# Patient Record
Sex: MALE | Race: BLACK OR AFRICAN AMERICAN | NOT HISPANIC OR LATINO | Employment: UNEMPLOYED | URBAN - NONMETROPOLITAN AREA
[De-identification: names, ages, dates, MRNs, and addresses within clinical notes are randomized per-mention and may not be internally consistent; named-entity substitution may affect disease eponyms.]

---

## 2023-12-19 ENCOUNTER — TELEPHONE (OUTPATIENT)
Dept: CARDIOLOGY | Facility: CLINIC | Age: 48
End: 2023-12-19
Payer: COMMERCIAL

## 2023-12-19 NOTE — TELEPHONE ENCOUNTER
Patients surgery is 12/21/1923 Nurse states he has probably already started to hold his asa and Plavix.  Wants to know if she should cancel the surgery? [phone] option 3

## 2023-12-19 NOTE — TELEPHONE ENCOUNTER
Return call from surgeon's office. Surgery will be rescheduled upon approval for hold timeframe of his medications.

## 2023-12-19 NOTE — TELEPHONE ENCOUNTER
Lu from Dr. Landrum office called requesting POC and okay to hold Plavix and ASA prior to Surgery.  Unsure of what surgery or date of the surgery. Left message for Lu to return call with this information.     Once she responds we can forward to Dr. Luz Grijalva MD to review upon return.

## 2024-01-24 PROBLEM — Z79.4 DIABETES MELLITUS WITH INSULIN THERAPY (MULTI): Status: ACTIVE | Noted: 2024-01-24

## 2024-01-24 PROBLEM — E78.2 MIXED HYPERLIPIDEMIA: Status: ACTIVE | Noted: 2024-01-24

## 2024-01-24 PROBLEM — I10 ESSENTIAL HYPERTENSION: Status: ACTIVE | Noted: 2024-01-24

## 2024-01-24 PROBLEM — I25.10 CAD (CORONARY ARTERY DISEASE): Status: ACTIVE | Noted: 2024-01-24

## 2024-01-24 PROBLEM — E11.9 DIABETES MELLITUS WITH INSULIN THERAPY (MULTI): Status: ACTIVE | Noted: 2024-01-24

## 2024-01-24 RX ORDER — HYDROCHLOROTHIAZIDE 25 MG/1
25 TABLET ORAL DAILY
COMMUNITY
Start: 2023-10-19

## 2024-01-24 RX ORDER — METOPROLOL TARTRATE 50 MG/1
50 TABLET ORAL
COMMUNITY
Start: 2023-10-11

## 2024-01-24 RX ORDER — ALBUTEROL SULFATE 90 UG/1
2 AEROSOL, METERED RESPIRATORY (INHALATION) EVERY 6 HOURS PRN
COMMUNITY
Start: 2023-11-17

## 2024-01-24 RX ORDER — ATORVASTATIN CALCIUM 80 MG/1
80 TABLET, FILM COATED ORAL DAILY
COMMUNITY
Start: 2023-10-11

## 2024-01-24 RX ORDER — DULOXETIN HYDROCHLORIDE 20 MG/1
40 CAPSULE, DELAYED RELEASE ORAL DAILY
COMMUNITY
Start: 2023-09-10

## 2024-01-24 RX ORDER — INSULIN GLARGINE 100 [IU]/ML
INJECTION, SOLUTION SUBCUTANEOUS
COMMUNITY
Start: 2023-11-16

## 2024-01-24 RX ORDER — AMLODIPINE BESYLATE 5 MG/1
5 TABLET ORAL DAILY
COMMUNITY
Start: 2023-11-01 | End: 2024-05-01

## 2024-01-24 RX ORDER — CLOPIDOGREL BISULFATE 75 MG/1
75 TABLET ORAL DAILY
COMMUNITY
Start: 2023-09-30

## 2024-01-24 RX ORDER — INSULIN ASPART 100 [IU]/ML
INJECTION, SOLUTION INTRAVENOUS; SUBCUTANEOUS
COMMUNITY
Start: 2023-10-29

## 2024-01-24 RX ORDER — EMPAGLIFLOZIN 25 MG/1
25 TABLET, FILM COATED ORAL DAILY
COMMUNITY
Start: 2023-12-11

## 2024-01-24 RX ORDER — NITROGLYCERIN 0.4 MG/1
0.4 TABLET SUBLINGUAL EVERY 5 MIN PRN
COMMUNITY
Start: 2023-12-11

## 2024-02-06 ENCOUNTER — OFFICE VISIT (OUTPATIENT)
Dept: CARDIOLOGY | Facility: CLINIC | Age: 49
End: 2024-02-06
Payer: COMMERCIAL

## 2024-02-06 VITALS
HEIGHT: 70 IN | SYSTOLIC BLOOD PRESSURE: 156 MMHG | WEIGHT: 281 LBS | HEART RATE: 84 BPM | BODY MASS INDEX: 40.23 KG/M2 | DIASTOLIC BLOOD PRESSURE: 84 MMHG

## 2024-02-06 DIAGNOSIS — I10 ESSENTIAL HYPERTENSION: ICD-10-CM

## 2024-02-06 DIAGNOSIS — I25.10 CORONARY ARTERY DISEASE INVOLVING NATIVE CORONARY ARTERY OF NATIVE HEART WITHOUT ANGINA PECTORIS: Primary | ICD-10-CM

## 2024-02-06 DIAGNOSIS — E78.2 MIXED HYPERLIPIDEMIA: ICD-10-CM

## 2024-02-06 PROCEDURE — 3008F BODY MASS INDEX DOCD: CPT | Performed by: INTERNAL MEDICINE

## 2024-02-06 PROCEDURE — 3079F DIAST BP 80-89 MM HG: CPT | Performed by: INTERNAL MEDICINE

## 2024-02-06 PROCEDURE — 3077F SYST BP >= 140 MM HG: CPT | Performed by: INTERNAL MEDICINE

## 2024-02-06 PROCEDURE — 99214 OFFICE O/P EST MOD 30 MIN: CPT | Performed by: INTERNAL MEDICINE

## 2024-02-06 RX ORDER — NEBULIZER AND COMPRESSOR
EACH MISCELLANEOUS
Qty: 1 EACH | Refills: 0 | Status: SHIPPED | OUTPATIENT
Start: 2024-02-06

## 2024-02-06 RX ORDER — SPIRONOLACTONE AND HYDROCHLOROTHIAZIDE 25; 25 MG/1; MG/1
1 TABLET ORAL DAILY
Qty: 90 TABLET | Refills: 3 | Status: SHIPPED | OUTPATIENT
Start: 2024-02-06 | End: 2025-02-05

## 2024-02-06 NOTE — PATIENT INSTRUCTIONS
Please bring all medicines, vitamins, and herbal supplements with you when you come to the office.    Prescriptions will not be filled unless you are compliant with your follow up appointments or have a follow up appointment scheduled as per instruction of your physician. Refills should be requested at the time of your visit.     Stop hydrochlorothiazide  Start Aldactazide  Chem 6  B/p check 2 months  Follow up 6 months

## 2024-02-06 NOTE — PROGRESS NOTES
"Subjective   Nataliya Alcantar is a 48 y.o. male       Chief Complaint    Follow-up          HPI   Patient is here for follow-up and management for coronary disease previous myocardial infarction and PCI in [facility], hypertension, hyperlipidemia.  Since last time I saw him he denies any cardiac complaint.  Described functional class I.  He denies lightheadedness, dizziness or syncope.  His recent laboratory data showed potassium of 3.4.  His blood pressure is suboptimally controlled.    Assessment     1. Coronary artery disease with prior presentation with myocardial infarction while incarcerated status post PCI I suspect to the right coronary artery but record is not available this was done in [facility]  2. Hypertension not well-controlled  3. Hyperlipidemia no recent lab  4. Diabetes mellitus  5. History of angioneurotic edema with Ace  6. Obesity  7.  Recent labs suggest mild hypokalemia with potassium 3.4     Plan     1. We discussed risk factor modification  2.  I advised him to switch hydrochlorothiazide to Aldactazide to improve his blood pressure and address his potassium and will have him come back for blood pressure check BMP and lipid profile in a few weeks  3. I recommended exercise and losing weight  4. 6-month follow-up  5. Obtain records from [facility]   Review of Systems   All other systems reviewed and are negative.           Visit Vitals  /84 (BP Location: Left arm, Patient Position: Sitting)   Pulse 84   Ht 1.778 m (5' 10\")   Wt 127 kg (281 lb)   BMI 40.32 kg/m²   Smoking Status Never   BSA 2.5 m²        Objective   Physical Exam  Constitutional:       Appearance: Normal appearance. He is normal weight.   HENT:      Nose: Nose normal.   Neck:      Vascular: No carotid bruit.   Cardiovascular:      Rate and Rhythm: Normal rate.      Pulses: Normal pulses.      Heart sounds: Normal heart sounds.   Pulmonary:      Effort: Pulmonary effort is normal.   Abdominal:      General: Bowel sounds are " normal.      Palpations: Abdomen is soft.   Genitourinary:     Rectum: Normal.   Musculoskeletal:         General: Normal range of motion.      Cervical back: Normal range of motion.      Right lower leg: No edema.      Left lower leg: No edema.   Skin:     General: Skin is warm and dry.   Neurological:      General: No focal deficit present.      Mental Status: He is alert.   Psychiatric:         Mood and Affect: Mood normal.         Behavior: Behavior normal.         Thought Content: Thought content normal.         Judgment: Judgment normal.         Current Medications    Current Outpatient Medications:     amLODIPine (Norvasc) 5 mg tablet, Take 1 tablet (5 mg) by mouth once daily., Disp: , Rfl:     atorvastatin (Lipitor) 80 mg tablet, Take 1 tablet (80 mg) by mouth once daily., Disp: , Rfl:     clopidogrel (Plavix) 75 mg tablet, Take 1 tablet (75 mg) by mouth once daily., Disp: , Rfl:     DULoxetine (Cymbalta) 20 mg DR capsule, Take 2 capsules (40 mg) by mouth once daily., Disp: , Rfl:     hydroCHLOROthiazide (HYDRODiuril) 25 mg tablet, Take 1 tablet (25 mg) by mouth once daily., Disp: , Rfl:     Jardiance 25 mg, Take 1 tablet (25 mg) by mouth once daily., Disp: , Rfl:     Lantus Solostar U-100 Insulin 100 unit/mL (3 mL) pen, Inject under the skin. As directed, Disp: , Rfl:     metoprolol tartrate (Lopressor) 50 mg tablet, Take 1 tablet by mouth 2 times a day with meals., Disp: , Rfl:     nitroglycerin (Nitrostat) 0.4 mg SL tablet, Place 1 tablet (0.4 mg) under the tongue every 5 minutes if needed for chest pain., Disp: , Rfl:     NovoLOG Flexpen U-100 Insulin 100 unit/mL (3 mL) pen, Inject under the skin. As directed, Disp: , Rfl:     Ventolin HFA 90 mcg/actuation inhaler, Inhale 2 puffs every 6 hours if needed., Disp: , Rfl:     miscellaneous medical supply (Blood Pressure Cuff) misc, B/p monitor as directed, Disp: 1 each, Rfl: 0    spironolacton-hydrochlorothiaz (Aldactazide) 25-25 mg tablet, Take 1 tablet (25  mg) by mouth once daily., Disp: 90 tablet, Rfl: 3                     Assessment/Plan   1. Coronary artery disease involving native coronary artery of native heart without angina pectoris  Basic Metabolic Panel    Follow Up In Cardiology    Follow Up In Cardiology      2. Essential hypertension  spironolacton-hydrochlorothiaz (Aldactazide) 25-25 mg tablet    Basic Metabolic Panel    Follow Up In Cardiology    Follow Up In Cardiology    miscellaneous medical supply (Blood Pressure Cuff) misc      3. Mixed hyperlipidemia        4. BMI 40.0-44.9, adult (CMS/HCC)           Scribe Attestation  By signing my name below, I, [email or web address] , Scribe   attest that this documentation has been prepared under the direction and in the presence of Luz Grijalva MD.

## 2024-02-06 NOTE — LETTER
February 6, 2024     CASEY Ridley  [address] [place] [id number]    Patient: Nataliya Alcantar   YOB: 1975   Date of Visit: 2/6/2024       Dear SAMANTHA Schwab-CNP:    Thank you for referring Nataliya Alcantar to me for evaluation. Below are my notes for this consultation.  If you have questions, please do not hesitate to call me. I look forward to following your patient along with you.       Sincerely,     Luz Grijalva MD      CC: No Recipients  ______________________________________________________________________________________    Subjective   Nataliya Alcantar is a 48 y.o. male       Chief Complaint    Follow-up          HPI   Patient is here for follow-up and management for coronary disease previous myocardial infarction and PCI in [facility], hypertension, hyperlipidemia.  Since last time I saw him he denies any cardiac complaint.  Described functional class I.  He denies lightheadedness, dizziness or syncope.  His recent laboratory data showed potassium of 3.4.  His blood pressure is suboptimally controlled.    Assessment     1. Coronary artery disease with prior presentation with myocardial infarction while incarcerated status post PCI I suspect to the right coronary artery but record is not available this was done in [facility]  2. Hypertension not well-controlled  3. Hyperlipidemia no recent lab  4. Diabetes mellitus  5. History of angioneurotic edema with Ace  6. Obesity  7.  Recent labs suggest mild hypokalemia with potassium 3.4     Plan     1. We discussed risk factor modification  2.  I advised him to switch hydrochlorothiazide to Aldactazide to improve his blood pressure and address his potassium and will have him come back for blood pressure check BMP and lipid profile in a few weeks  3. I recommended exercise and losing weight  4. 6-month follow-up  5. Obtain records from [facility]   Review of Systems   All other systems reviewed and are negative.    "        Visit Vitals  /84 (BP Location: Left arm, Patient Position: Sitting)   Pulse 84   Ht 1.778 m (5' 10\")   Wt 127 kg (281 lb)   BMI 40.32 kg/m²   Smoking Status Never   BSA 2.5 m²        Objective   Physical Exam  Constitutional:       Appearance: Normal appearance. He is normal weight.   HENT:      Nose: Nose normal.   Neck:      Vascular: No carotid bruit.   Cardiovascular:      Rate and Rhythm: Normal rate.      Pulses: Normal pulses.      Heart sounds: Normal heart sounds.   Pulmonary:      Effort: Pulmonary effort is normal.   Abdominal:      General: Bowel sounds are normal.      Palpations: Abdomen is soft.   Genitourinary:     Rectum: Normal.   Musculoskeletal:         General: Normal range of motion.      Cervical back: Normal range of motion.      Right lower leg: No edema.      Left lower leg: No edema.   Skin:     General: Skin is warm and dry.   Neurological:      General: No focal deficit present.      Mental Status: He is alert.   Psychiatric:         Mood and Affect: Mood normal.         Behavior: Behavior normal.         Thought Content: Thought content normal.         Judgment: Judgment normal.         Current Medications    Current Outpatient Medications:   •  amLODIPine (Norvasc) 5 mg tablet, Take 1 tablet (5 mg) by mouth once daily., Disp: , Rfl:   •  atorvastatin (Lipitor) 80 mg tablet, Take 1 tablet (80 mg) by mouth once daily., Disp: , Rfl:   •  clopidogrel (Plavix) 75 mg tablet, Take 1 tablet (75 mg) by mouth once daily., Disp: , Rfl:   •  DULoxetine (Cymbalta) 20 mg DR capsule, Take 2 capsules (40 mg) by mouth once daily., Disp: , Rfl:   •  hydroCHLOROthiazide (HYDRODiuril) 25 mg tablet, Take 1 tablet (25 mg) by mouth once daily., Disp: , Rfl:   •  Jardiance 25 mg, Take 1 tablet (25 mg) by mouth once daily., Disp: , Rfl:   •  Lantus Solostar U-100 Insulin 100 unit/mL (3 mL) pen, Inject under the skin. As directed, Disp: , Rfl:   •  metoprolol tartrate (Lopressor) 50 mg tablet, " Take 1 tablet by mouth 2 times a day with meals., Disp: , Rfl:   •  nitroglycerin (Nitrostat) 0.4 mg SL tablet, Place 1 tablet (0.4 mg) under the tongue every 5 minutes if needed for chest pain., Disp: , Rfl:   •  NovoLOG Flexpen U-100 Insulin 100 unit/mL (3 mL) pen, Inject under the skin. As directed, Disp: , Rfl:   •  Ventolin HFA 90 mcg/actuation inhaler, Inhale 2 puffs every 6 hours if needed., Disp: , Rfl:   •  miscellaneous medical supply (Blood Pressure Cuff) misc, B/p monitor as directed, Disp: 1 each, Rfl: 0  •  spironolacton-hydrochlorothiaz (Aldactazide) 25-25 mg tablet, Take 1 tablet (25 mg) by mouth once daily., Disp: 90 tablet, Rfl: 3                     Assessment/Plan   1. Coronary artery disease involving native coronary artery of native heart without angina pectoris  Basic Metabolic Panel    Follow Up In Cardiology    Follow Up In Cardiology      2. Essential hypertension  spironolacton-hydrochlorothiaz (Aldactazide) 25-25 mg tablet    Basic Metabolic Panel    Follow Up In Cardiology    Follow Up In Cardiology    miscellaneous medical supply (Blood Pressure Cuff) misc      3. Mixed hyperlipidemia        4. BMI 40.0-44.9, adult (CMS/HCC)           Scribe Attestation  By signing my name below, [email or web address] , Scribe   attest that this documentation has been prepared under the direction and in the presence of Luz Grijalva MD.

## 2024-04-09 ENCOUNTER — APPOINTMENT (OUTPATIENT)
Dept: CARDIOLOGY | Facility: CLINIC | Age: 49
End: 2024-04-09
Payer: COMMERCIAL

## 2024-04-29 DIAGNOSIS — I10 ESSENTIAL HYPERTENSION: ICD-10-CM

## 2024-05-01 RX ORDER — AMLODIPINE BESYLATE 5 MG/1
5 TABLET ORAL DAILY
Qty: 90 TABLET | Refills: 1 | Status: SHIPPED | OUTPATIENT
Start: 2024-05-01

## 2024-05-01 RX ORDER — DULOXETINE 40 MG/1
40 CAPSULE, DELAYED RELEASE ORAL DAILY
COMMUNITY
Start: 2024-04-03

## 2024-08-06 ENCOUNTER — APPOINTMENT (OUTPATIENT)
Dept: CARDIOLOGY | Facility: CLINIC | Age: 49
End: 2024-08-06
Payer: COMMERCIAL

## 2024-09-11 ENCOUNTER — APPOINTMENT (OUTPATIENT)
Dept: CARDIOLOGY | Facility: CLINIC | Age: 49
End: 2024-09-11
Payer: COMMERCIAL

## 2024-10-23 DIAGNOSIS — I10 ESSENTIAL HYPERTENSION: ICD-10-CM

## 2024-10-23 RX ORDER — AMLODIPINE BESYLATE 5 MG/1
5 TABLET ORAL DAILY
Qty: 90 TABLET | Refills: 3 | Status: SHIPPED | OUTPATIENT
Start: 2024-10-23 | End: 2025-10-23

## 2024-11-11 ENCOUNTER — DOCUMENTATION (OUTPATIENT)
Dept: CARDIOLOGY | Facility: CLINIC | Age: 49
End: 2024-11-11
Payer: COMMERCIAL

## 2025-01-02 ENCOUNTER — APPOINTMENT (OUTPATIENT)
Dept: CARDIOLOGY | Facility: CLINIC | Age: 50
End: 2025-01-02
Payer: COMMERCIAL

## 2025-01-28 ENCOUNTER — APPOINTMENT (OUTPATIENT)
Dept: CARDIOLOGY | Facility: CLINIC | Age: 50
End: 2025-01-28
Payer: COMMERCIAL

## 2025-02-23 DIAGNOSIS — I10 ESSENTIAL HYPERTENSION: ICD-10-CM

## 2025-02-25 RX ORDER — SPIRONOLACTONE AND HYDROCHLOROTHIAZIDE 25; 25 MG/1; MG/1
1 TABLET ORAL DAILY
Qty: 90 TABLET | Refills: 3 | Status: SHIPPED | OUTPATIENT
Start: 2025-02-25 | End: 2026-02-25

## 2025-07-31 ENCOUNTER — APPOINTMENT (OUTPATIENT)
Dept: CARDIOLOGY | Facility: CLINIC | Age: 50
End: 2025-07-31
Payer: COMMERCIAL